# Patient Record
Sex: FEMALE | Race: WHITE | NOT HISPANIC OR LATINO | Employment: UNEMPLOYED | URBAN - METROPOLITAN AREA
[De-identification: names, ages, dates, MRNs, and addresses within clinical notes are randomized per-mention and may not be internally consistent; named-entity substitution may affect disease eponyms.]

---

## 2017-12-15 ENCOUNTER — TRANSFERRED RECORDS (OUTPATIENT)
Dept: HEALTH INFORMATION MANAGEMENT | Facility: CLINIC | Age: 52
End: 2017-12-15

## 2018-08-01 ENCOUNTER — TRANSFERRED RECORDS (OUTPATIENT)
Dept: HEALTH INFORMATION MANAGEMENT | Facility: CLINIC | Age: 53
End: 2018-08-01

## 2018-08-08 ENCOUNTER — TRANSFERRED RECORDS (OUTPATIENT)
Dept: HEALTH INFORMATION MANAGEMENT | Facility: CLINIC | Age: 53
End: 2018-08-08

## 2021-06-08 LAB
CHOLESTEROL (EXTERNAL): 125 MG/DL (ref 0–199)
HBA1C MFR BLD: 5.5 % (ref 0–5.6)
HDLC SERPL-MCNC: 48 MG/DL (ref 23–92)
LDL CHOLESTEROL (EXTERNAL): 46 MG/DL (ref 0–99)
NON HDL CHOLESTEROL (EXTERNAL): 77 MG/DL
TRIGLYCERIDES (EXTERNAL): 156 MG/DL (ref 0–150)

## 2021-07-02 ENCOUNTER — TRANSFERRED RECORDS (OUTPATIENT)
Dept: HEALTH INFORMATION MANAGEMENT | Facility: CLINIC | Age: 56
End: 2021-07-02

## 2021-09-11 LAB
ALT SERPL-CCNC: 19 U/L (ref 9–43)
AST SERPL-CCNC: 18 U/L (ref 13–39)
CREATININE (EXTERNAL): 0.77 MG/DL (ref 0.6–1.3)
GFR ESTIMATED (EXTERNAL): 78 ML/MIN/1.73M2 (ref 60–300)
GFR ESTIMATED (IF AFRICAN AMERICAN) (EXTERNAL): 94 ML/MIN/1.73M2 (ref 60–300)
GLUCOSE (EXTERNAL): 194 MG/DL (ref 70–100)
INR (EXTERNAL): 0.9 (ref 0.9–1.1)
POTASSIUM (EXTERNAL): 3 MMOL/L (ref 3.5–5.1)

## 2021-10-02 ENCOUNTER — TRANSFERRED RECORDS (OUTPATIENT)
Dept: MULTI SPECIALTY CLINIC | Facility: CLINIC | Age: 56
End: 2021-10-02

## 2021-10-02 LAB — TSH SERPL-ACNC: 0.46 UIU/ML (ref 0.3–5.33)

## 2021-12-27 ENCOUNTER — TRANSFERRED RECORDS (OUTPATIENT)
Dept: HEALTH INFORMATION MANAGEMENT | Facility: CLINIC | Age: 56
End: 2021-12-27

## 2021-12-27 LAB — EJECTION FRACTION: NORMAL %

## 2022-01-19 ENCOUNTER — TRANSCRIBE ORDERS (OUTPATIENT)
Dept: OTHER | Age: 57
End: 2022-01-19

## 2022-01-19 DIAGNOSIS — Q27.8 ABERRANT SUBCLAVIAN ARTERY: Primary | ICD-10-CM

## 2022-01-20 ENCOUNTER — TELEPHONE (OUTPATIENT)
Dept: VASCULAR SURGERY | Facility: CLINIC | Age: 57
End: 2022-01-20

## 2022-01-20 NOTE — TELEPHONE ENCOUNTER
New vascular referral   Referral Type: Vascular Surgery   Preferred Location: [facility]   Scheduling Instructions: Please call to schedule your appointment     Associated Diagnoses  Aberrant subclavian artery [Q27.8]  - Primary         Referring provider: Patrick Maier @ [facility] in [place], [place]     Additional info: Pt previously followed by Dr. Maier in vascular surgery in [place], [place] for her aberrant subclavian artery. She was receiving CTAs every 2 years for surveillance. She states she also met with [facility] [place] over 5 years ago, but surgery was not recommended at that time.    She is moving to [place] in June, and is hoping to establish ongoing care here. She is due for her next CTA and vascular visit in December of 2022.    I will request CTA imaging from [facility] be pushed over to our system. I will plan to touch base with her at the end of the summer to get her scheduled for follow up. She is in agreement with this plan and has my direct number should any issues arise in the meantime. She stated her [place] health insurance will take effect in September.    TAYLOR Medina, RN  [facility] - [facility] Vascular Surgery  Ph: [phone]  Fax: [phone]

## 2022-01-20 NOTE — TELEPHONE ENCOUNTER
I contacted Geovanna about her vascular referral. Demographics state pt lives in [place]. I called to confirm this as we are unable to provide virtual visits to patients out of state. I was unable to reach her but left my direct callback.    MENG MedinaN, RN  [facility] - [facility] Vascular Surgery  Ph: [phone]  Fax: [phone]

## 2022-08-15 ENCOUNTER — TELEPHONE (OUTPATIENT)
Dept: VASCULAR SURGERY | Facility: CLINIC | Age: 57
End: 2022-08-15

## 2022-08-15 DIAGNOSIS — Q27.8 ABERRANT SUBCLAVIAN ARTERY: Primary | ICD-10-CM

## 2022-08-15 NOTE — TELEPHONE ENCOUNTER
Vascular Clinic Appointment Request    Imaging needed? Yes. Orders placed.     Visit type: virtual visit     Provider: Dr. Berry    Timeframe: December 2022    Appt notes: Aberrant subclavian annual surveillance    Additional info: New pt slot    Documentation routed to clinic coordinators for scheduling.      MENG MedinaN, RN  [facility] - [facility] Vascular Surgery  Ph: [phone]  Fax: [phone]

## 2022-08-18 NOTE — TELEPHONE ENCOUNTER
Pt has been scheduled as requested.    MENG MedinaN, RN  [facility] - [facility] Vascular Surgery  Ph: [phone]  Fax: [phone]

## 2022-09-03 NOTE — TELEPHONE ENCOUNTER
DIAGNOSIS: Aberrant subclavian annual surveillance   DATE RECEIVED: 12.14.22   NOTES STATUS DETAILS   OFFICE NOTE from referring provider CE 1.19.22, 12.21.20  Maier  [facility]   MEDICATION LIST Internal    PERTINENT LABS CE    CTA (CT ANGIOGRAPHY) Pacs *sched* for 12.9.22  CTA Chest    9.11.21, 12.21.20, 1.14.19  CTA Chest

## 2022-10-03 ENCOUNTER — HEALTH MAINTENANCE LETTER (OUTPATIENT)
Age: 57
End: 2022-10-03

## 2022-11-16 ENCOUNTER — ANCILLARY PROCEDURE (OUTPATIENT)
Dept: CT IMAGING | Facility: CLINIC | Age: 57
End: 2022-11-16
Attending: SURGERY
Payer: COMMERCIAL

## 2022-11-16 DIAGNOSIS — Q27.8 ABERRANT SUBCLAVIAN ARTERY: ICD-10-CM

## 2022-11-16 LAB
CREAT BLD-MCNC: 0.8 MG/DL (ref 0.5–1)
GFR SERPL CREATININE-BSD FRML MDRD: >60 ML/MIN/1.73M2

## 2022-11-16 PROCEDURE — 82565 ASSAY OF CREATININE: CPT | Performed by: PATHOLOGY

## 2022-11-16 PROCEDURE — 71275 CT ANGIOGRAPHY CHEST: CPT | Mod: GC | Performed by: RADIOLOGY

## 2022-11-16 RX ORDER — IOPAMIDOL 755 MG/ML
100 INJECTION, SOLUTION INTRAVASCULAR ONCE
Status: COMPLETED | OUTPATIENT
Start: 2022-11-16 | End: 2022-11-16

## 2022-11-16 RX ADMIN — IOPAMIDOL 100 ML: 755 INJECTION, SOLUTION INTRAVASCULAR at 13:31

## 2022-11-30 ENCOUNTER — VIRTUAL VISIT (OUTPATIENT)
Dept: VASCULAR SURGERY | Facility: CLINIC | Age: 57
End: 2022-11-30
Payer: COMMERCIAL

## 2022-11-30 DIAGNOSIS — Q27.8 ABERRANT SUBCLAVIAN ARTERY: Primary | ICD-10-CM

## 2022-11-30 PROCEDURE — 99204 OFFICE O/P NEW MOD 45 MIN: CPT | Mod: 95 | Performed by: SURGERY

## 2022-11-30 RX ORDER — METOPROLOL TARTRATE 25 MG/1
12.5 TABLET, FILM COATED ORAL
COMMUNITY
Start: 2021-06-15 | End: 2023-06-21

## 2022-11-30 RX ORDER — CHOLECALCIFEROL (VITAMIN D3) 50 MCG
TABLET ORAL
COMMUNITY

## 2022-11-30 RX ORDER — ASPIRIN 81 MG/1
TABLET, CHEWABLE ORAL
COMMUNITY

## 2022-11-30 RX ORDER — LOSARTAN POTASSIUM 100 MG/1
TABLET ORAL
COMMUNITY
End: 2023-06-21

## 2022-11-30 RX ORDER — CHLORTHALIDONE 25 MG/1
1 TABLET ORAL DAILY
COMMUNITY
Start: 2021-06-09 | End: 2023-06-21

## 2022-11-30 RX ORDER — PIOGLITAZONEHYDROCHLORIDE 15 MG/1
TABLET ORAL
COMMUNITY
Start: 2020-06-12 | End: 2023-07-15

## 2022-11-30 RX ORDER — OMEGA-3 FATTY ACIDS/FISH OIL 300-1000MG
CAPSULE ORAL
COMMUNITY

## 2022-11-30 NOTE — LETTER
11/30/2022       RE: Geovanna Renae  [address] [address] [place] [id number]     Dear Colleague,    Thank you for referring your patient, Geovanna Renae, to the [facility] [place] at [facility]. Please see a copy of my visit note below.      Vascular Surgery Consultation Note     Patient:  Geovanna Renae   Date of birth 1965, Medical record number [id number]  Date of Visit:  12/01/2022  Consult Requester:No att. providers found            Assessment and Recommendations:   ASSESSMENT / RECOMMENDATION:    Very pleasant 57-year-old female with aberrant right subclavian artery without aneurysmal degeneration at its origin.  When I look back the origin of the aberrant right subclavian artery, it has been a similar size of approximately 18 to 20 mm for the last 5 to 6 years.  I have suggested we do a noncontrast CT scan of her chest in 2 years.  If things are stable at that time, she could likely go to every 3 to 4 years for follow-up.  She remains a non-smoker and normotensive.  I encouraged her to reach out if any additional questions arise that we did not go over today.    Many thanks for involving me in the care of this very pleasant patient. Should any questions or concerns arise, please don't hesitate to contact me.    Warm Regards,    Mallorie Berry MD, DFSVS, RPVI  Director, [facility] Vascular Services  Professor and Chief, Vascular and Endovascular Surgery  [facility]  Pager: 1. Send message or 10 digit call back number Securely via [employer] with the [employer] Web Console (learn more here)              2. Outside of [facility]? 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  She has been followed by Dr. Quincy Maier of [facility] in the [place] area for this for several years.  It was identified when she was having some issues with her throat.  She notes no current challenges with swallowing and does not have to restrict her diet because of this.  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  She moved to [place] and is currently considering work options.      Review of Systems   Constitutional, HEENT, cardiovascular, pulmonary, GI, , musculoskeletal, neuro, skin, endocrine and psych systems are negative, except as otherwise noted.    Physical Exam   GENERAL: Healthy, alert and no distress  EYES: Eyes grossly normal to inspection.  No discharge or erythema, or obvious scleral/conjunctival abnormalities.  RESP: No audible wheeze, cough, or visible cyanosis.  No visible retractions or increased work of breathing.    SKIN: Visible skin clear. No significant rash, abnormal pigmentation or lesions.  NEURO: Cranial nerves grossly intact.  Mentation and speech appropriate for age.  PSYCH: Mentation appears normal, affect normal/bright, judgement and insight intact, normal speech and appearance well-groomed.    Imaging: CT scan imaging reveals an aberrant right subclavian artery measuring approximately 18 mm in maximal diameter just distal to the origin of the descending thoracic aorta.    Video Start Time: 1000  Video End Time: 1030        Brenna is a 57 year old who is being evaluated via a billable video visit.      How would you like to obtain your AVS? MyChart  If the video visit is dropped, the invitation should be resent by: Text to cell phone: [phone]  Will anyone else be joining your video visit? No   Patient states is currently in the [facility]: Yes        Originating Location (pt. Location): Home        Distant Location (provider location):   Off-site    Platform used for Video Visit: [employer]        Again, thank you for allowing me to participate in the care of your patient.      Sincerely,    Mallorie Berry MD

## 2022-11-30 NOTE — PATIENT INSTRUCTIONS
Thank you so much for choosing us for your care. It was a pleasure to see you at the vascular clinic today.     Follow-up recommendations: We will see you back in 2 years Please do not hesitate to reach out to us in the meantime with any concerns. Our schedulers will get in touch with you to set up your follow-up visit.    Additional testing/imaging ordered today: CT scan of your chest in 2 years.      Our scheduling team will get in touch with you to set up any follow-up testing/imaging and/or appointments. Please be aware that any testing/imaging recommended today will need to completed prior to your next visit with the provider. If testing/imaging is not completed prior to your next visit, your visit may be rescheduled.        If you have any questions, please call Shoshana Johnson RN at [phone] or contact our clinic at [phone]. We also encourage the use of [employer] to communicate with your HealthCare Provider.      If you have an urgent need after business hours (8:00 am to 4:30 pm) please call [phone], option 4, and ask for the vascular attending on call. For non-urgent after hours needs, please call the vascular nurse at [phone] and leave a detailed voicemail. For scheduling needs, please call our clinic directly at [phone].

## 2022-11-30 NOTE — LETTER
Date:December 3, 2022      Provider requested that no letter be sent. Do not send.       [facility]

## 2022-11-30 NOTE — PROGRESS NOTES
Vascular Surgery Consultation Note     Patient:  Geovanna Renae   Date of birth 1965, Medical record number [id number]  Date of Visit:  12/01/2022  Consult Requester:No att. providers found            Assessment and Recommendations:   ASSESSMENT / RECOMMENDATION:    Very pleasant 57-year-old female with aberrant right subclavian artery without aneurysmal degeneration at its origin.  When I look back the origin of the aberrant right subclavian artery, it has been a similar size of approximately 18 to 20 mm for the last 5 to 6 years.  I have suggested we do a noncontrast CT scan of her chest in 2 years.  If things are stable at that time, she could likely go to every 3 to 4 years for follow-up.  She remains a non-smoker and normotensive.  I encouraged her to reach out if any additional questions arise that we did not go over today.    Many thanks for involving me in the care of this very pleasant patient. Should any questions or concerns arise, please don't hesitate to contact me.    Warm Regards,    Mallorie Berry MD, DFSVS, RPVI  Director, [facility] Vascular Services  Professor and Chief, Vascular and Endovascular Surgery  [facility]  Pager: 1. Send message or 10 digit call back number Securely via [employer] with the [employer] Web Console (learn more here)              2. Outside of [facility]? 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  She has been followed by Dr. Quincy Maier of [facility] in the [place] area for this for several years.  It was identified when she was having some issues with her throat.  She notes no current challenges with swallowing and does  not have to restrict her diet because of this.  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  She moved to [place] and is currently considering work options.      Review of Systems   Constitutional, HEENT, cardiovascular, pulmonary, GI, , musculoskeletal, neuro, skin, endocrine and psych systems are negative, except as otherwise noted.    Physical Exam   GENERAL: Healthy, alert and no distress  EYES: Eyes grossly normal to inspection.  No discharge or erythema, or obvious scleral/conjunctival abnormalities.  RESP: No audible wheeze, cough, or visible cyanosis.  No visible retractions or increased work of breathing.    SKIN: Visible skin clear. No significant rash, abnormal pigmentation or lesions.  NEURO: Cranial nerves grossly intact.  Mentation and speech appropriate for age.  PSYCH: Mentation appears normal, affect normal/bright, judgement and insight intact, normal speech and appearance well-groomed.    Imaging: CT scan imaging reveals an aberrant right subclavian artery measuring approximately 18 mm in maximal diameter just distal to the origin of the descending thoracic aorta.    Video Start Time: 1000  Video End Time: 1030        Brenna is a 57 year old who is being evaluated via a billable video visit.      How would you like to obtain your AVS? MyChart  If the video visit is dropped, the invitation should be resent by: Text to cell phone: [phone]  Will anyone else be joining your video visit? No   Patient states is currently in the [facility]: Yes        Originating Location (pt. Location): Home        Distant Location (provider location):  Off-site    Platform used for Video Visit: [employer]

## 2022-11-30 NOTE — NURSING NOTE
Chief Complaint   Patient presents with     Consult       Patient confirms medications and allergies are accurate via patients echeck in completion, and or denies any changes since last reviewed/verified.       Pavithra Pascual, Virtual Facilitator

## 2022-12-14 ENCOUNTER — PRE VISIT (OUTPATIENT)
Dept: VASCULAR SURGERY | Facility: CLINIC | Age: 57
End: 2022-12-14

## 2023-06-14 ASSESSMENT — ENCOUNTER SYMPTOMS
SHORTNESS OF BREATH: 0
PALPITATIONS: 0
CHILLS: 0
HEMATURIA: 0
HEARTBURN: 0
NAUSEA: 0
COUGH: 0
CONSTIPATION: 0
DYSURIA: 0
ARTHRALGIAS: 1
DIZZINESS: 0
DIARRHEA: 0
HEMATOCHEZIA: 0
EYE PAIN: 0
MYALGIAS: 1
BREAST MASS: 0
FEVER: 0
ABDOMINAL PAIN: 0
PARESTHESIAS: 0
NERVOUS/ANXIOUS: 0
WEAKNESS: 0
HEADACHES: 0
JOINT SWELLING: 1
FREQUENCY: 0
SORE THROAT: 0

## 2023-06-21 ENCOUNTER — OFFICE VISIT (OUTPATIENT)
Dept: FAMILY MEDICINE | Facility: CLINIC | Age: 58
End: 2023-06-21
Payer: COMMERCIAL

## 2023-06-21 VITALS
HEART RATE: 63 BPM | WEIGHT: 176.9 LBS | BODY MASS INDEX: 29.47 KG/M2 | HEIGHT: 65 IN | RESPIRATION RATE: 17 BRPM | OXYGEN SATURATION: 96 % | SYSTOLIC BLOOD PRESSURE: 137 MMHG | TEMPERATURE: 98 F | DIASTOLIC BLOOD PRESSURE: 89 MMHG

## 2023-06-21 DIAGNOSIS — Z12.11 COLON CANCER SCREENING: ICD-10-CM

## 2023-06-21 DIAGNOSIS — R73.03 PREDIABETES: ICD-10-CM

## 2023-06-21 DIAGNOSIS — I10 BENIGN ESSENTIAL HYPERTENSION: ICD-10-CM

## 2023-06-21 DIAGNOSIS — E78.1 HYPERTRIGLYCERIDEMIA: ICD-10-CM

## 2023-06-21 DIAGNOSIS — Z00.00 ROUTINE GENERAL MEDICAL EXAMINATION AT A HEALTH CARE FACILITY: Primary | ICD-10-CM

## 2023-06-21 DIAGNOSIS — E04.2 MULTIPLE THYROID NODULES: ICD-10-CM

## 2023-06-21 DIAGNOSIS — Z12.4 CERVICAL CANCER SCREENING: ICD-10-CM

## 2023-06-21 DIAGNOSIS — Z90.710 S/P HYSTERECTOMY: ICD-10-CM

## 2023-06-21 DIAGNOSIS — E78.5 HYPERLIPIDEMIA, UNSPECIFIED HYPERLIPIDEMIA TYPE: ICD-10-CM

## 2023-06-21 DIAGNOSIS — Z11.59 NEED FOR HEPATITIS C SCREENING TEST: ICD-10-CM

## 2023-06-21 DIAGNOSIS — Z13.1 SCREENING FOR DIABETES MELLITUS (DM): ICD-10-CM

## 2023-06-21 DIAGNOSIS — Q27.8 ABERRANT RIGHT SUBCLAVIAN ARTERY: ICD-10-CM

## 2023-06-21 DIAGNOSIS — Z12.31 ENCOUNTER FOR SCREENING MAMMOGRAM FOR MALIGNANT NEOPLASM OF BREAST: ICD-10-CM

## 2023-06-21 LAB
ALBUMIN SERPL BCG-MCNC: 4.8 G/DL (ref 3.5–5.2)
ALP SERPL-CCNC: 74 U/L (ref 35–104)
ALT SERPL W P-5'-P-CCNC: 32 U/L (ref 0–50)
ANION GAP SERPL CALCULATED.3IONS-SCNC: 12 MMOL/L (ref 7–15)
AST SERPL W P-5'-P-CCNC: 30 U/L (ref 0–45)
BILIRUB SERPL-MCNC: 0.5 MG/DL
BUN SERPL-MCNC: 16 MG/DL (ref 6–20)
CALCIUM SERPL-MCNC: 9.9 MG/DL (ref 8.6–10)
CHLORIDE SERPL-SCNC: 100 MMOL/L (ref 98–107)
CHOLEST SERPL-MCNC: 163 MG/DL
CREAT SERPL-MCNC: 0.7 MG/DL (ref 0.51–0.95)
CREAT UR-MCNC: 29.4 MG/DL
DEPRECATED HCO3 PLAS-SCNC: 27 MMOL/L (ref 22–29)
ERYTHROCYTE [DISTWIDTH] IN BLOOD BY AUTOMATED COUNT: 12.3 % (ref 10–15)
GFR SERPL CREATININE-BSD FRML MDRD: >90 ML/MIN/1.73M2
GLUCOSE SERPL-MCNC: 103 MG/DL (ref 70–99)
HBA1C MFR BLD: 5.4 % (ref 0–5.6)
HCT VFR BLD AUTO: 40.4 % (ref 35–47)
HDLC SERPL-MCNC: 40 MG/DL
HGB BLD-MCNC: 13.8 G/DL (ref 11.7–15.7)
LDLC SERPL CALC-MCNC: 89 MG/DL
MCH RBC QN AUTO: 29.9 PG (ref 26.5–33)
MCHC RBC AUTO-ENTMCNC: 34.2 G/DL (ref 31.5–36.5)
MCV RBC AUTO: 88 FL (ref 78–100)
MICROALBUMIN UR-MCNC: <12 MG/L
MICROALBUMIN/CREAT UR: NORMAL MG/G{CREAT}
NONHDLC SERPL-MCNC: 123 MG/DL
PLATELET # BLD AUTO: 235 10E3/UL (ref 150–450)
POTASSIUM SERPL-SCNC: 3.4 MMOL/L (ref 3.4–5.3)
PROT SERPL-MCNC: 7.8 G/DL (ref 6.4–8.3)
RBC # BLD AUTO: 4.61 10E6/UL (ref 3.8–5.2)
SODIUM SERPL-SCNC: 139 MMOL/L (ref 136–145)
TRIGL SERPL-MCNC: 170 MG/DL
TSH SERPL DL<=0.005 MIU/L-ACNC: 0.53 UIU/ML (ref 0.3–4.2)
WBC # BLD AUTO: 6.2 10E3/UL (ref 4–11)

## 2023-06-21 PROCEDURE — 85027 COMPLETE CBC AUTOMATED: CPT | Performed by: FAMILY MEDICINE

## 2023-06-21 PROCEDURE — 86803 HEPATITIS C AB TEST: CPT | Performed by: FAMILY MEDICINE

## 2023-06-21 PROCEDURE — 84443 ASSAY THYROID STIM HORMONE: CPT | Performed by: FAMILY MEDICINE

## 2023-06-21 PROCEDURE — 90716 VAR VACCINE LIVE SUBQ: CPT | Performed by: FAMILY MEDICINE

## 2023-06-21 PROCEDURE — 80053 COMPREHEN METABOLIC PANEL: CPT | Performed by: FAMILY MEDICINE

## 2023-06-21 PROCEDURE — 99214 OFFICE O/P EST MOD 30 MIN: CPT | Mod: 25 | Performed by: FAMILY MEDICINE

## 2023-06-21 PROCEDURE — 36415 COLL VENOUS BLD VENIPUNCTURE: CPT | Performed by: FAMILY MEDICINE

## 2023-06-21 PROCEDURE — 83036 HEMOGLOBIN GLYCOSYLATED A1C: CPT | Performed by: FAMILY MEDICINE

## 2023-06-21 PROCEDURE — 99386 PREV VISIT NEW AGE 40-64: CPT | Mod: 25 | Performed by: FAMILY MEDICINE

## 2023-06-21 PROCEDURE — 82570 ASSAY OF URINE CREATININE: CPT | Performed by: FAMILY MEDICINE

## 2023-06-21 PROCEDURE — 80061 LIPID PANEL: CPT | Performed by: FAMILY MEDICINE

## 2023-06-21 PROCEDURE — 82043 UR ALBUMIN QUANTITATIVE: CPT | Performed by: FAMILY MEDICINE

## 2023-06-21 PROCEDURE — 90471 IMMUNIZATION ADMIN: CPT | Performed by: FAMILY MEDICINE

## 2023-06-21 RX ORDER — LOSARTAN POTASSIUM 100 MG/1
100 TABLET ORAL DAILY
Qty: 90 TABLET | Refills: 3 | Status: SHIPPED | OUTPATIENT
Start: 2023-06-21 | End: 2023-06-21

## 2023-06-21 RX ORDER — LOSARTAN POTASSIUM 100 MG/1
100 TABLET ORAL DAILY
Qty: 90 TABLET | Refills: 3 | Status: SHIPPED | OUTPATIENT
Start: 2023-06-21 | End: 2024-07-23

## 2023-06-21 RX ORDER — CHLORTHALIDONE 25 MG/1
25 TABLET ORAL DAILY
Qty: 90 TABLET | Refills: 3 | Status: SHIPPED | OUTPATIENT
Start: 2023-06-21 | End: 2023-06-21

## 2023-06-21 RX ORDER — METOPROLOL TARTRATE 25 MG/1
25 TABLET, FILM COATED ORAL EVERY EVENING
Qty: 90 TABLET | Refills: 3 | Status: SHIPPED | OUTPATIENT
Start: 2023-06-21 | End: 2023-06-21

## 2023-06-21 RX ORDER — CHLORTHALIDONE 25 MG/1
25 TABLET ORAL DAILY
Qty: 90 TABLET | Refills: 3 | Status: SHIPPED | OUTPATIENT
Start: 2023-06-21 | End: 2024-07-23

## 2023-06-21 RX ORDER — METOPROLOL TARTRATE 25 MG/1
25 TABLET, FILM COATED ORAL EVERY EVENING
Qty: 90 TABLET | Refills: 3 | Status: SHIPPED | OUTPATIENT
Start: 2023-06-21

## 2023-06-21 ASSESSMENT — ENCOUNTER SYMPTOMS
PARESTHESIAS: 0
CONSTIPATION: 0
CHILLS: 0
HEARTBURN: 0
DIARRHEA: 0
DIZZINESS: 0
HEMATURIA: 0
EYE PAIN: 0
ARTHRALGIAS: 1
BREAST MASS: 0
SHORTNESS OF BREATH: 0
NERVOUS/ANXIOUS: 0
COUGH: 0
HEADACHES: 0
MYALGIAS: 1
JOINT SWELLING: 1
PALPITATIONS: 0
FEVER: 0
NAUSEA: 0
SORE THROAT: 0
WEAKNESS: 0
HEMATOCHEZIA: 0
DYSURIA: 0
ABDOMINAL PAIN: 0
FREQUENCY: 0

## 2023-06-21 ASSESSMENT — PAIN SCALES - GENERAL: PAINLEVEL: NO PAIN (0)

## 2023-06-21 NOTE — PATIENT INSTRUCTIONS
Call  for mammogram, thyroid US    Preventive Health Recommendations  Female Ages 50 - 64    Yearly exam: See your health care provider every year in order to  Review health changes.   Discuss preventive care.    Review your medicines if your doctor has prescribed any.    Get a Pap test every three years (unless you have an abnormal result and your provider advises testing more often).  If you get Pap tests with HPV test, you only need to test every 5 years, unless you have an abnormal result.   You do not need a Pap test if your uterus was removed (hysterectomy) and you have not had cancer.  You should be tested each year for STDs (sexually transmitted diseases) if you're at risk.   Have a mammogram every 1 to 2 years.  Have a colonoscopy at age 50, or have a yearly FIT test (stool test). These exams screen for colon cancer.    Have a cholesterol test every 5 years, or more often if advised.  Have a diabetes test (fasting glucose) every three years. If you are at risk for diabetes, you should have this test more often.   If you are at risk for osteoporosis (brittle bone disease), think about having a bone density scan (DEXA).    Shots: Get a flu shot each year. Get a tetanus shot every 10 years.    Nutrition:   Eat at least 5 servings of fruits and vegetables each day.  Eat whole-grain bread, whole-wheat pasta and brown rice instead of white grains and rice.  Get adequate Calcium and Vitamin D.     Lifestyle  Exercise at least 150 minutes a week (30 minutes a day, 5 days a week). This will help you control your weight and prevent disease.  Limit alcohol to one drink per day.  No smoking.   Wear sunscreen to prevent skin cancer.   See your dentist every six months for an exam and cleaning.  See your eye doctor every 1 to 2 years.

## 2023-06-21 NOTE — NURSING NOTE
Prior to immunization administration, verified patients identity using patient s name and date of birth. Please see Immunization Activity for additional information.     Screening Questionnaire for Adult Immunization    Are you sick today?   No   Do you have allergies to medications, food, a vaccine component or latex?   No   Have you ever had a serious reaction after receiving a vaccination?   No   Do you have a long-term health problem with heart, lung, kidney, or metabolic disease (e.g., diabetes), asthma, a blood disorder, no spleen, complement component deficiency, a cochlear implant, or a spinal fluid leak?  Are you on long-term aspirin therapy?   No   Do you have cancer, leukemia, HIV/AIDS, or any other immune system problem?   No   Do you have a parent, brother, or sister with an immune system problem?   No   In the past 3 months, have you taken medications that affect  your immune system, such as prednisone, other steroids, or anticancer drugs; drugs for the treatment of rheumatoid arthritis, Crohn s disease, or psoriasis; or have you had radiation treatments?   No   Have you had a seizure, or a brain or other nervous system problem?   No   During the past year, have you received a transfusion of blood or blood    products, or been given immune (gamma) globulin or antiviral drug?   No   For women: Are you pregnant or is there a chance you could become       pregnant during the next month?   No   Have you received any vaccinations in the past 4 weeks?   No     Immunization questionnaire answers were all negative.      Patient instructed to remain in clinic for 15 minutes afterwards, and to report any adverse reactions.     Screening performed by Luz Maria Alcala MA on 6/21/2023 at 11:42 AM.

## 2023-06-21 NOTE — PROGRESS NOTES
SUBJECTIVE:   CC: Brenna is an 57 year old who presents for preventive health visit.   Patient is new to the provider, is here to establish care, for annual physical and for medication recheck  Past medical history significant for hypertension, hyperlipidemia, prediabetes, multiple thyroid nodules, fibromyalgia, and arthritis aberrant right subclavian artery  Patient moved from [place] last year      6/21/2023    10:41 AM   Additional Questions   Roomed by Agatha   Accompanied by Self         6/21/2023    10:41 AM   Patient Reported Additional Medications   Patient reports taking the following new medications None     Healthy Habits:     Getting at least 3 servings of Calcium per day:  Yes    Bi-annual eye exam:  Yes    Dental care twice a year:  Yes    Sleep apnea or symptoms of sleep apnea:  None    Diet:  Regular (no restrictions)    Frequency of exercise:  4-5 days/week    Duration of exercise:  30-45 minutes    Taking medications regularly:  Yes    Medication side effects:  Not applicable    PHQ-2 Total Score: 0    Additional concerns today:  No                  Hypertension Follow-up      Do you check your blood pressure regularly outside of the clinic? Yes     Are you following a low salt diet? Yes    Are your blood pressures ever more than 140 on the top number (systolic) OR more   than 90 on the bottom number (diastolic), for example 140/90?  In the 130s over 80s      Today's PHQ-2 Score:       6/21/2023    10:24 AM   PHQ-2 ( 1999 [employer])   Q1: Little interest or pleasure in doing things 0   Q2: Feeling down, depressed or hopeless 0   PHQ-2 Score 0   Q1: Little interest or pleasure in doing things Not at all   Q2: Feeling down, depressed or hopeless Not at all   PHQ-2 Score 0       Have you ever done Advance Care Planning? (For example, a Health Directive, POLST, or a discussion with a medical provider or your loved ones about your wishes): No, advance care planning information given to patient to  review.  Patient plans to discuss their wishes with loved ones or provider.      Social History     Tobacco Use     Smoking status: Never     Passive exposure: Never     Smokeless tobacco: Never   Substance Use Topics     Alcohol use: Never             6/14/2023     3:58 PM   Alcohol Use   Prescreen: >3 drinks/day or >7 drinks/week? No          No data to display              Reviewed orders with patient.  Reviewed health maintenance and updated orders accordingly - Yes  Lab work is in process  Labs reviewed in EPIC  BP Readings from Last 3 Encounters:   06/21/23 137/89    Wt Readings from Last 3 Encounters:   06/21/23 80.2 kg (176 lb 14.4 oz)                  Patient Active Problem List   Diagnosis     Aberrant right subclavian artery     Past Surgical History:   Procedure Laterality Date     BIOPSY      Thyroid nodule  2017     BREAST SURGERY  2015    Papilloma and milk ducts removed right breast     CHOLECYSTECTOMY  2019     COLONOSCOPY  2020     ENT SURGERY  1976    T&A     GENITOURINARY SURGERY      Cystoscopy 2004, cystoscopy with renal stent 2015, Urethral sling 2017     GYN SURGERY      Laparoscopies(1992, 1994, 1998) Endometrial ablation (2003, 2004), Hysterectomy (2004)     ORTHOPEDIC SURGERY      Left meniscus repair 2007, Right meniscus repair 2015       Social History     Tobacco Use     Smoking status: Never     Passive exposure: Never     Smokeless tobacco: Never   Substance Use Topics     Alcohol use: Never     Family History   Problem Relation Age of Onset     Diabetes Mother         Type 2     Coronary Artery Disease Mother      Hypertension Mother      Hyperlipidemia Mother      Cerebrovascular Disease Mother      Osteoporosis Mother      Hypertension Father      Hyperlipidemia Father      Other Cancer Father         CLL, Kidney, skin     Thyroid Disease Father         Hypothyroidism     Colon Cancer Maternal Grandmother      Prostate Cancer Paternal Grandfather      Hypertension Son       Hyperlipidemia Daughter      Asthma Daughter          Current Outpatient Medications   Medication Sig Dispense Refill     aspirin (ASA) 81 MG chewable tablet        chlorthalidone (HYGROTON) 25 MG tablet Take 1 tablet (25 mg) by mouth daily 90 tablet 3     evolocumab (REPATHA) 140 MG/ML prefilled autoinjector        losartan (COZAAR) 100 MG tablet Take 1 tablet (100 mg) by mouth daily 90 tablet 3     metoprolol tartrate (LOPRESSOR) 25 MG tablet Take 1 tablet (25 mg) by mouth every evening 90 tablet 3     omega 3 1000 MG CAPS        pioglitazone (ACTOS) 15 MG tablet        vitamin D3 (CHOLECALCIFEROL) 50 mcg (2000 units) tablet        Allergies   Allergen Reactions     Codeine Hives, Itching, Rash and Shortness Of Breath     Statins Other (See Comments)     Statins, Pravastatin,  Rosuvastatin     Myalgias, arthralgias  myalgias  Myalgias, arthralgias  myalgias  Myalgias, arthralgias  myalgias       Zetia [Ezetimibe] Muscle Pain (Myalgia)     Recent Labs   Lab Test 22  1329 21  1138   TRIG  --  156*   CR 0.8  --    GFRESTIMATED >60  --         Breast Cancer Screenin/14/2023     3:59 PM   Breast CA Risk Assessment (FHS-7)   Do you have a family history of breast, colon, or ovarian cancer? No / Unknown       click delete button to remove this line now  Mammogram Screening: Recommended mammography every 1-2 years with patient discussion and risk factor consideration  Pertinent mammograms are reviewed under the imaging tab.    History of abnormal Pap smear: Status post benign hysterectomy. Health Maintenance and Surgical History updated.     Reviewed and updated as needed this visit by clinical staff   Tobacco  Allergies  Meds     Rock Benoit          Reviewed and updated as needed this visit by Provider         Rock Benoit           Past Medical History:   Diagnosis Date     Aberrant right subclavian artery      Arthritis 2017    Osteoarthritis 2019     Fibromyalgia      Hand arthritis      Hypertension  1996     Lipidosis      Prediabetes      Thyroid nodule       Past Surgical History:   Procedure Laterality Date     BIOPSY      Thyroid nodule  2017     BREAST SURGERY  2015    Papilloma and milk ducts removed right breast     CHOLECYSTECTOMY  2019     COLONOSCOPY  2020     ENT SURGERY  1976    T&A     GENITOURINARY SURGERY      Cystoscopy 2004, cystoscopy with renal stent 2015, Urethral sling 2017     GYN SURGERY      Laparoscopies(1992, 1994, 1998) Endometrial ablation (2003, 2004), Hysterectomy (2004)     ORTHOPEDIC SURGERY      Left meniscus repair 2007, Right meniscus repair 2015     OB History   No obstetric history on file.       Review of Systems   Constitutional: Negative for chills and fever.   HENT: Negative for congestion, ear pain, hearing loss and sore throat.    Eyes: Negative for pain and visual disturbance.   Respiratory: Negative for cough and shortness of breath.    Cardiovascular: Negative for chest pain, palpitations and peripheral edema.   Gastrointestinal: Negative for abdominal pain, constipation, diarrhea, heartburn, hematochezia and nausea.   Breasts:  Positive for tenderness. Negative for breast mass and discharge.   Genitourinary: Negative for dysuria, frequency, genital sores, hematuria, pelvic pain, urgency, vaginal bleeding and vaginal discharge.   Musculoskeletal: Positive for arthralgias, joint swelling and myalgias.   Skin: Negative for rash.   Neurological: Negative for dizziness, weakness, headaches and paresthesias.   Psychiatric/Behavioral: Negative for mood changes. The patient is not nervous/anxious.      CONSTITUTIONAL: NEGATIVE for fever, chills, change in weight  INTEGUMENTARY/SKIN: NEGATIVE for worrisome rashes, moles or lesions  EYES: NEGATIVE for vision changes or irritation  ENT: NEGATIVE for ear, mouth and throat problems  RESP: NEGATIVE for significant cough or SOB  BREAST: NEGATIVE for masses, tenderness or discharge  CV: NEGATIVE for chest pain, palpitations or  "peripheral edema  CV: Hx HTN  GI: NEGATIVE for nausea, abdominal pain, heartburn, or change in bowel habits  : NEGATIVE for unusual urinary or vaginal symptoms. No vaginal bleeding.  MUSCULOSKELETAL: History of hand arthritis  NEURO: NEGATIVE for weakness, dizziness or paresthesias  ENDOCRINE: NEGATIVE for temperature intolerance, skin/hair changes  ENDOCRINE: Multiple thyroid nodules  HEME/ALLERGY/IMMUNE: NEGATIVE for bleeding problems  HEME/ALLERGY/IMMUNE: Aberrant right subclavian artery  PSYCHIATRIC: NEGATIVE for changes in mood or affect      OBJECTIVE:   /89   Pulse 63   Temp 98  F (36.7  C) (Oral)   Resp 17   Ht 1.651 m (5' 5\")   Wt 80.2 kg (176 lb 14.4 oz)   SpO2 96%   Breastfeeding No   BMI 29.44 kg/m    Physical Exam  GENERAL APPEARANCE: healthy, alert and no distress  EYES: Eyes grossly normal to inspection, PERRL and conjunctivae and sclerae normal  HENT: ear canals and TM's normal, nose and mouth without ulcers or lesions, oropharynx clear and oral mucous membranes moist  NECK: no adenopathy, no asymmetry, masses, or scars and thyroid normal to palpation  RESP: lungs clear to auscultation - no rales, rhonchi or wheezes  BREAST: normal without masses, tenderness or nipple discharge and no palpable axillary masses or adenopathy  CV: regular rate and rhythm, normal S1 S2, no S3 or S4, no murmur, click or rub, no peripheral edema and peripheral pulses strong  ABDOMEN: soft, nontender, no hepatosplenomegaly, no masses and bowel sounds normal  MS: no musculoskeletal defects are noted and gait is age appropriate without ataxia  SKIN: no suspicious lesions or rashes  NEURO: Normal strength and tone, sensory exam grossly normal, mentation intact and speech normal  PSYCH: mentation appears normal and affect normal/bright    Diagnostic Test Results:  Labs reviewed in Epic    ASSESSMENT/PLAN:   (Z00.00) Routine general medical examination at a health care facility  (primary encounter " diagnosis)  Comment:   Plan: Discussed on regular exercises, daily calcium intake, healthy eating, self breast exams monthly and routine dental checks    (I10) Benign essential hypertension  Comment:   Plan: losartan (COZAAR) 100 MG tablet, chlorthalidone        (HYGROTON) 25 MG tablet, metoprolol tartrate         (LOPRESSOR) 25 MG tablet, CBC with platelets,         Comprehensive metabolic panel (BMP + Alb, Alk         Phos, ALT, AST, Total. Bili, TP), Albumin         Random Urine Quantitative with Creat Ratio          BP Readings from Last 6 Encounters:   06/21/23 137/89     Initial blood pressure was 159/79, 145/79.  Rechecked-137/89  Recommended to increase the dose of metoprolol from 12.5 mg to 25 mg once daily in the evening continue with current dose of losartan 100 mg daily and chlorthalidone 25 mg once daily,  Continue with self-monitoring of blood pressure at home  Continue with healthy eating, regular exercises and weight loss  We will get fasting labs today    (E04.2) Multiple thyroid nodules  Comment:   Plan: US Thyroid, TSH with free T4 reflex        We will get the thyroid ultrasound and thyroid labs for ongoing surveillance    (R73.03) Prediabetes  Comment:   Plan: Comprehensive metabolic panel (BMP + Alb, Alk         Phos, ALT, AST, Total. Bili, TP), Hemoglobin         A1c        Will f/u on results and call with recommendations.      (E78.5) Hyperlipidemia, unspecified hyperlipidemia type  Comment:   Plan: Adult Cardiology Eval  Referral, Lipid        panel reflex to direct LDL Fasting        Patient is currently on Repatha injection and Actos 15 mg 3 times a week per cardiology  Will start seeing cardiology within the [facility] for ongoing care    (E78.1) Hypertriglyceridemia  Comment:   Plan: Adult Cardiology Eval  Referral, Lipid        panel reflex to direct LDL Fasting        as above      (Q27.8) Aberrant right subclavian artery  Comment:   Plan: Patient is  "currently following up with Dr. Berry in vascular surgery    (Z11.59) Need for hepatitis C screening test  Comment:   Plan: Hepatitis C Screen Reflex to HCV RNA Quant and         Genotype            (Z90.710) S/P hysterectomy  Comment: For adenomyosis  Plan: No Pap needed    (Z12.4) Cervical cancer screening  Comment:   Plan: as above      (Z12.31) Encounter for screening mammogram for malignant neoplasm of breast  Comment:   Plan: MA Screen Bilateral w/[facility]            (Z13.1) Screening for diabetes mellitus (DM)  Comment:   Plan: Comprehensive metabolic panel (BMP + Alb, Alk         Phos, ALT, AST, Total. Bili, TP), Hemoglobin         A1c            (Z12.11) Colon cancer screening  Comment:   Plan: Patient reported having a normal colonoscopy on 7/1/2020 , recheck in 2030 or sooner if needed    Patient has been advised of split billing requirements and indicates understanding: Yes      COUNSELING:  Reviewed preventive health counseling, as reflected in patient instructions  Special attention given to:        Regular exercise       Healthy diet/nutrition       Vision screening       Hearing screening       Immunizations    Vaccinated for: Varicella             Osteoporosis prevention/bone health       Colorectal Cancer Screening       Consider Hep C screening for all patients one time for ages 18-79 years       (Megan)menopause management       The ASCVD Risk score (Charles DK, et al., 2019) failed to calculate for the following reasons:    The valid total cholesterol range is 130 to 320 mg/dL      BMI:   Estimated body mass index is 29.44 kg/m  as calculated from the following:    Height as of this encounter: 1.651 m (5' 5\").    Weight as of this encounter: 80.2 kg (176 lb 14.4 oz).   Weight management plan: Continue with efforts on healthy eating, regular exercises      She reports that she has never smoked. She has never been exposed to tobacco smoke. She has never used smokeless tobacco.      Chart documentation " done in part with Dragon Voice recognition Software. Although reviewed after completion, some word and grammatical error may remain.  Adenike Simmons MD  [facility]

## 2023-06-22 ENCOUNTER — ANCILLARY PROCEDURE (OUTPATIENT)
Dept: MAMMOGRAPHY | Facility: CLINIC | Age: 58
End: 2023-06-22
Attending: FAMILY MEDICINE
Payer: COMMERCIAL

## 2023-06-22 ENCOUNTER — ANCILLARY PROCEDURE (OUTPATIENT)
Dept: ULTRASOUND IMAGING | Facility: CLINIC | Age: 58
End: 2023-06-22
Attending: FAMILY MEDICINE
Payer: COMMERCIAL

## 2023-06-22 DIAGNOSIS — E04.2 MULTIPLE THYROID NODULES: ICD-10-CM

## 2023-06-22 DIAGNOSIS — Z12.31 ENCOUNTER FOR SCREENING MAMMOGRAM FOR MALIGNANT NEOPLASM OF BREAST: ICD-10-CM

## 2023-06-22 LAB — HCV AB SERPL QL IA: NONREACTIVE

## 2023-06-22 PROCEDURE — 77067 SCR MAMMO BI INCL CAD: CPT | Mod: GC | Performed by: STUDENT IN AN ORGANIZED HEALTH CARE EDUCATION/TRAINING PROGRAM

## 2023-06-22 PROCEDURE — 76536 US EXAM OF HEAD AND NECK: CPT | Performed by: RADIOLOGY

## 2023-06-22 PROCEDURE — 77063 BREAST TOMOSYNTHESIS BI: CPT | Mod: GC | Performed by: STUDENT IN AN ORGANIZED HEALTH CARE EDUCATION/TRAINING PROGRAM

## 2023-06-23 NOTE — RESULT ENCOUNTER NOTE
Mg Ceja,  Your thyroid scan showed multiple nodules as before with slightly increased size  We will continue to monitor the scan annually  Let me know if you develop any symptoms concerning for breathing, swallowing, change in voice   Let me know if you have any questions. Take care.  Adenike Simmons MD

## 2023-06-23 NOTE — RESULT ENCOUNTER NOTE
Mg Ceja,  Your recent labs showed normal hemoglobin, liver and kidney functions, thyroid test, negative hepatitis C screening and normal urine with no protein leak.  These are good and reassuring  Your fasting blood sugar is slightly elevated but not concerning for diabetes or prediabetes given the normal A1c  Your fasting cholesterol numbers are in good range except for slightly elevated triglycerides and low HDL which will improve with improving efforts on weight loss, regular exercises and healthy eating   Let me know if you have any questions. Take care.  Adenike Simmons MD

## 2023-07-15 ENCOUNTER — MYC REFILL (OUTPATIENT)
Dept: FAMILY MEDICINE | Facility: CLINIC | Age: 58
End: 2023-07-15
Payer: COMMERCIAL

## 2023-07-15 DIAGNOSIS — R73.03 PREDIABETES: Primary | ICD-10-CM

## 2023-07-17 RX ORDER — PIOGLITAZONEHYDROCHLORIDE 15 MG/1
15 TABLET ORAL DAILY
Qty: 90 TABLET | Refills: 1 | Status: SHIPPED | OUTPATIENT
Start: 2023-07-17

## 2024-03-06 ENCOUNTER — TELEPHONE (OUTPATIENT)
Dept: FAMILY MEDICINE | Facility: CLINIC | Age: 59
End: 2024-03-06
Payer: COMMERCIAL

## 2024-03-06 NOTE — TELEPHONE ENCOUNTER
Patient Quality Outreach    Patient is due for the following:   Hypertension -  BP check      Topic Date Due    Flu Vaccine (1) 09/01/2023    COVID-19 Vaccine (4 - 2023-24 season) 09/01/2023       Next Steps:   Schedule a nurse only visit for BP check  and immunizations    Type of outreach:    Sent [employer] message.      Questions for provider review:    None           Micaela Wang

## 2024-07-22 DIAGNOSIS — I10 BENIGN ESSENTIAL HYPERTENSION: ICD-10-CM

## 2024-07-23 ENCOUNTER — MYC MEDICAL ADVICE (OUTPATIENT)
Dept: FAMILY MEDICINE | Facility: CLINIC | Age: 59
End: 2024-07-23
Payer: COMMERCIAL

## 2024-07-23 RX ORDER — CHLORTHALIDONE 25 MG/1
25 TABLET ORAL DAILY
Qty: 30 TABLET | Refills: 0 | Status: SHIPPED | OUTPATIENT
Start: 2024-07-23

## 2024-07-23 RX ORDER — LOSARTAN POTASSIUM 100 MG/1
100 TABLET ORAL DAILY
Qty: 30 TABLET | Refills: 0 | Status: SHIPPED | OUTPATIENT
Start: 2024-07-23

## 2024-07-27 ENCOUNTER — HEALTH MAINTENANCE LETTER (OUTPATIENT)
Age: 59
End: 2024-07-27

## 2025-08-10 ENCOUNTER — HEALTH MAINTENANCE LETTER (OUTPATIENT)
Age: 60
End: 2025-08-10

## 2025-08-31 ENCOUNTER — HEALTH MAINTENANCE LETTER (OUTPATIENT)
Age: 60
End: 2025-08-31